# Patient Record
Sex: MALE
[De-identification: names, ages, dates, MRNs, and addresses within clinical notes are randomized per-mention and may not be internally consistent; named-entity substitution may affect disease eponyms.]

---

## 2024-05-29 ENCOUNTER — NON-APPOINTMENT (OUTPATIENT)
Age: 29
End: 2024-05-29

## 2024-05-29 PROBLEM — Z00.00 ENCOUNTER FOR PREVENTIVE HEALTH EXAMINATION: Status: ACTIVE | Noted: 2024-05-29

## 2024-05-30 ENCOUNTER — APPOINTMENT (OUTPATIENT)
Dept: UROLOGY | Facility: CLINIC | Age: 29
End: 2024-05-30
Payer: MEDICAID

## 2024-05-30 ENCOUNTER — NON-APPOINTMENT (OUTPATIENT)
Age: 29
End: 2024-05-30

## 2024-05-30 VITALS
TEMPERATURE: 98 F | HEART RATE: 92 BPM | DIASTOLIC BLOOD PRESSURE: 76 MMHG | HEIGHT: 64 IN | WEIGHT: 175 LBS | SYSTOLIC BLOOD PRESSURE: 125 MMHG | BODY MASS INDEX: 29.88 KG/M2

## 2024-05-30 DIAGNOSIS — B35.6 TINEA CRURIS: ICD-10-CM

## 2024-05-30 PROCEDURE — G2211 COMPLEX E/M VISIT ADD ON: CPT | Mod: NC,1L

## 2024-05-30 PROCEDURE — 99203 OFFICE O/P NEW LOW 30 MIN: CPT

## 2024-05-30 PROCEDURE — 93975 VASCULAR STUDY: CPT

## 2024-05-30 RX ORDER — CLOTRIMAZOLE AND BETAMETHASONE DIPROPIONATE 10; .5 MG/G; MG/G
1-0.05 CREAM TOPICAL TWICE DAILY
Qty: 1 | Refills: 1 | Status: ACTIVE | COMMUNITY
Start: 2024-05-30 | End: 1900-01-01

## 2024-05-30 NOTE — PHYSICAL EXAM
[Normal Appearance] : normal appearance [General Appearance - In No Acute Distress] : no acute distress [Edema] : no peripheral edema [Exaggerated Use Of Accessory Muscles For Inspiration] : no accessory muscle use [Abdomen Soft] : soft [Abdomen Tenderness] : non-tender [Abdomen Hernia] : no hernia was discovered [Normal Station and Gait] : the gait and station were normal for the patient's age [] : no rash [Oriented To Time, Place, And Person] : oriented to person, place, and time [Affect] : the affect was normal [de-identified] : left graide I, possible right grade 1 palpable testicles b/l 16 cc

## 2024-05-30 NOTE — END OF VISIT
[FreeTextEntry3] : I, Dr. Torres, personally performed the evaluation and management (E/M) services for this new patient.  That E/M includes conducting the clinically appropriate initial history &/or exam, assessing all conditions, and establishing the plan of care.  Today, my KIT, Genaro Palacios, was here to observe my evaluation and management service for this patient & follow plan of care established by me going forward.

## 2024-05-30 NOTE — HISTORY OF PRESENT ILLNESS
[FreeTextEntry1] : MARILEE LOPEZ is a 29 year M with no PMHx presenting for hypogonadism on 05/30/2024 Referred Rabbi Meek   He reports - he and wife, Ney Roth, have one previous child requiring IVF after 7 years of trying  - child is now almost 2 - patient did not require any assistance - negative female factor   He denies fever chills urinary complaints ED flank pain hx of kidney stones    Social history; Denies   Family history: paternal grandfather unknown cancer type    Allergies: NKDA

## 2024-05-30 NOTE — ASSESSMENT
[FreeTextEntry1] : 29M here for evaluation for hypogonadism   hypogonadism  - TT LH FSH E2 SA  - required IVF in the past - scrotal sono: 3.3 mm left varicoclee.  tinea cruris trial clotrimazole

## 2025-03-24 ENCOUNTER — NON-APPOINTMENT (OUTPATIENT)
Age: 30
End: 2025-03-24